# Patient Record
Sex: FEMALE | Race: WHITE
[De-identification: names, ages, dates, MRNs, and addresses within clinical notes are randomized per-mention and may not be internally consistent; named-entity substitution may affect disease eponyms.]

---

## 2019-06-19 ENCOUNTER — HOSPITAL ENCOUNTER (EMERGENCY)
Dept: HOSPITAL 91 - FTE | Age: 19
Discharge: HOME | End: 2019-06-19
Payer: SELF-PAY

## 2019-06-19 ENCOUNTER — HOSPITAL ENCOUNTER (EMERGENCY)
Dept: HOSPITAL 10 - FTE | Age: 19
Discharge: HOME | End: 2019-06-19
Payer: SELF-PAY

## 2019-06-19 VITALS — HEART RATE: 93 BPM | DIASTOLIC BLOOD PRESSURE: 86 MMHG | RESPIRATION RATE: 20 BRPM | SYSTOLIC BLOOD PRESSURE: 137 MMHG

## 2019-06-19 VITALS
WEIGHT: 175.49 LBS | HEIGHT: 65 IN | BODY MASS INDEX: 29.24 KG/M2 | WEIGHT: 175.49 LBS | BODY MASS INDEX: 29.24 KG/M2 | HEIGHT: 65 IN

## 2019-06-19 DIAGNOSIS — N39.0: Primary | ICD-10-CM

## 2019-06-19 LAB
ADD MAN DIFF?: NO
ADD UMIC: YES
ALANINE AMINOTRANSFERASE: 22 IU/L (ref 13–69)
ALBUMIN/GLOBULIN RATIO: 1.16
ALBUMIN: 4.2 G/DL (ref 3.3–4.9)
ALKALINE PHOSPHATASE: 90 IU/L (ref 42–121)
ANION GAP: 8 (ref 5–13)
ASPARTATE AMINO TRANSFERASE: 23 IU/L (ref 15–46)
BASOPHIL #: 0 10^3/UL (ref 0–0.1)
BASOPHILS %: 0.1 % (ref 0–2)
BILIRUBIN,DIRECT: 0 MG/DL (ref 0–0.2)
BILIRUBIN,TOTAL: 0.2 MG/DL (ref 0.2–1.3)
BLOOD UREA NITROGEN: 10 MG/DL (ref 7–20)
CALCIUM: 9.1 MG/DL (ref 8.4–10.2)
CARBON DIOXIDE: 28 MMOL/L (ref 21–31)
CHLORIDE: 104 MMOL/L (ref 97–110)
CREATININE: 0.68 MG/DL (ref 0.44–1)
EOSINOPHILS #: 0.1 10^3/UL (ref 0–0.5)
EOSINOPHILS %: 0.8 % (ref 0–7)
GLOBULIN: 3.6 G/DL (ref 1.3–3.2)
GLUCOSE: 85 MG/DL (ref 70–220)
HEMATOCRIT: 34.6 % (ref 37–47)
HEMOGLOBIN: 11.1 G/DL (ref 12–16)
IMMATURE GRANS #M: 0.01 10^3/UL (ref 0–0.03)
IMMATURE GRANS % (M): 0.1 % (ref 0–0.43)
LIPASE: 96 U/L (ref 23–300)
LYMPHOCYTES #: 3 10^3/UL (ref 0.8–2.9)
LYMPHOCYTES %: 41.2 % (ref 18–55)
MEAN CORPUSCULAR HEMOGLOBIN: 27.7 PG (ref 29–33)
MEAN CORPUSCULAR HGB CONC: 32.1 G/DL (ref 32–37)
MEAN CORPUSCULAR VOLUME: 86.3 FL (ref 72–104)
MEAN PLATELET VOLUME: 9.8 FL (ref 7.4–10.4)
MONOCYTE #: 0.6 10^3/UL (ref 0.3–0.9)
MONOCYTES %: 8 % (ref 0–13)
NEUTROPHIL #: 3.6 10^3/UL (ref 1.6–7.5)
NEUTROPHILS %: 49.8 % (ref 30–74)
NUCLEATED RED BLOOD CELLS #: 0 10^3/UL (ref 0–0)
NUCLEATED RED BLOOD CELLS%: 0 /100WBC (ref 0–0)
PLATELET COUNT: 326 10^3/UL (ref 140–415)
POTASSIUM: 4.1 MMOL/L (ref 3.5–5.1)
RED BLOOD COUNT: 4.01 10^6/UL (ref 4.2–5.4)
RED CELL DISTRIBUTION WIDTH: 14.3 % (ref 11.5–14.5)
SODIUM: 140 MMOL/L (ref 135–144)
TOTAL PROTEIN: 7.8 G/DL (ref 6.1–8.1)
UR ASCORBIC ACID: NEGATIVE MG/DL
UR BILIRUBIN (DIP): NEGATIVE MG/DL
UR BLOOD (DIP): (no result) MG/DL
UR CLARITY: (no result)
UR COLOR: YELLOW
UR GLUCOSE (DIP): NEGATIVE MG/DL
UR KETONES (DIP): NEGATIVE MG/DL
UR LEUKOCYTE ESTERASE (DIP): (no result) LEU/UL
UR MUCUS: (no result) /HPF
UR NITRITE (DIP): NEGATIVE MG/DL
UR PH (DIP): 6 (ref 5–9)
UR RBC: 4 /HPF (ref 0–5)
UR SPECIFIC GRAVITY (DIP): 1.02 (ref 1–1.03)
UR SQUAMOUS EPITHELIAL CELL: (no result) /HPF
UR TOTAL PROTEIN (DIP): NEGATIVE MG/DL
UR UROBILINOGEN (DIP): NEGATIVE MG/DL
UR WBC: 7 /HPF (ref 0–5)
WHITE BLOOD COUNT: 7.3 10^3/UL (ref 4.8–10.8)

## 2019-06-19 PROCEDURE — 81025 URINE PREGNANCY TEST: CPT

## 2019-06-19 PROCEDURE — 81001 URINALYSIS AUTO W/SCOPE: CPT

## 2019-06-19 PROCEDURE — 99283 EMERGENCY DEPT VISIT LOW MDM: CPT

## 2019-06-19 PROCEDURE — 83690 ASSAY OF LIPASE: CPT

## 2019-06-19 PROCEDURE — 85025 COMPLETE CBC W/AUTO DIFF WBC: CPT

## 2019-06-19 PROCEDURE — 36415 COLL VENOUS BLD VENIPUNCTURE: CPT

## 2019-06-19 PROCEDURE — 80053 COMPREHEN METABOLIC PANEL: CPT

## 2019-06-19 NOTE — ERD
ER Documentation


Chief Complaint


Chief Complaint





nausea,vomitting, back pain





HPI


This is a 18-year-old female with a nonsignificant past medical history presents


ED with complaints of off-and-on nausea and vomiting for the past 2 weeks.  


Patient states that she only gets nauseous in the evening after she eats a big 


meal.  Denies vomiting throughout the day.  Admits to low back pain and 


increased frequency of urination.  Denies dysuria, hematuria, hematemesis, 


diarrhea, constipation, abdominal pain, melena, hematochezia, vaginal pain, 


vaginal discharge.  Patient is sexually active but is on oral contraceptive.  


Patient is G0, P0.





ROS


All systems reviewed and are negative except as per history of present illness.





Allergies


Allergies:  


Coded Allergies:  


     No Known Allergy (Unverified , 6/19/19)





PMhx/Soc


Medical and Surgical Hx:  pt denies Medical Hx, pt denies Surgical Hx


Hx Alcohol Use:  No


Hx Substance Use:  No


Hx Tobacco Use:  No





FmHx


Family History:  No diabetes





Physical Exam


Vitals





Vital Signs


  Date      Temp  Pulse  Resp  B/P (MAP)   Pulse Ox  O2          O2 Flow    FiO2


Time                                                 Delivery    Rate


   6/19/19  98.9     93    20      137/86        98


     15:09                          (103)





Physical Exam


Physical Exam


Vitals signs: Reviewed by me.


General: Well developed, well nourished, in no acute distress. Patient is awake 


and alert.


Head: Normocephalic, atraumatic.


Eyes: Normal conjunctiva, Pupils PERRLA, EOM intact grossly


ENT: Pharynx is clear, Moist mucous membranes, external ears, nose and mouth 


normal


Neck: Supple, no masses, lymphadenopathy or JVD


Respiratory: Clear to auscultation bilaterally with no wheezing, rhonchi, rales,


no distress


Cardiovascular: RRR, no murmurs, rubs, or gallops


Abdominal: Soft, non-tender, non-distended, no peritoneal signs


: Deferred


MSK: No edema


Back: No midline tenderness.  No CVA tenderness


Neurologic: Alert and oriented, moving all extremities, normal speech, no focal 


weakness, no cerebellar signs. Normal mentation


Skin: warm and dry, No rash


Psych: Normal mood


Result Diagram:  


6/19/19 1607                                                                    


           6/19/19 1607





Results 24 hrs





Laboratory Tests


Test
                               6/19/19
16:00   6/19/19
16:07  6/19/19
16:10


Urine Color                       YELLOW


Urine Clarity
                    SLIGHTLY
CLOUDY  
               



Urine pH                                     6.0


Urine Specific Gravity                     1.024


Urine Ketones                     NEGATIVE mg/dL


Urine Nitrite                     NEGATIVE mg/dL


Urine Bilirubin                   NEGATIVE mg/dL


Urine Urobilinogen                NEGATIVE mg/dL


Urine Leukocyte Esterase               2+ Malcolm/ul


Urine Microscopic RBC                     4 /HPF


Urine Microscopic WBC                     7 /HPF


Urine Squamous Epithelial
Cells   FEW /HPF 
       
               



Urine Mucus                       FEW /HPF


Urine Hemoglobin                        3+ mg/dL


Urine Glucose                     NEGATIVE mg/dL


Urine Total Protein               NEGATIVE mg/dl


White Blood Count                                    7.3 10^3/ul


Red Blood Count                                     4.01 10^6/ul


Hemoglobin                                             11.1 g/dl


Hematocrit                                                34.6 %


Mean Corpuscular Volume                                  86.3 fl


Mean Corpuscular Hemoglobin                              27.7 pg


Mean Corpuscular                  
                   32.1 g/dl 
  



Hemoglobin
Concent


Red Cell Distribution Width                               14.3 %


Platelet Count                                       326 10^3/UL


Mean Platelet Volume                                      9.8 fl


Immature Granulocytes %                                  0.100 %


Neutrophils %                                             49.8 %


Lymphocytes %                                             41.2 %


Monocytes %                                                8.0 %


Eosinophils %                                              0.8 %


Basophils %                                                0.1 %


Nucleated Red Blood Cells %                          0.0 /100WBC


Immature Granulocytes #                            0.010 10^3/ul


Neutrophils #                                        3.6 10^3/ul


Lymphocytes #                                        3.0 10^3/ul


Monocytes #                                          0.6 10^3/ul


Eosinophils #                                        0.1 10^3/ul


Basophils #                                          0.0 10^3/ul


Nucleated Red Blood Cells #                          0.0 10^3/ul


Sodium Level                                          140 mmol/L


Potassium Level                                       4.1 mmol/L


Chloride Level                                        104 mmol/L


Carbon Dioxide Level                                   28 mmol/L


Anion Gap                                                      8


Blood Urea Nitrogen                                     10 mg/dl


Creatinine                                            0.68 mg/dl


Est Glomerular Filtrat            
                > 60 mL/min 
   



Rate
mL/min


Glucose Level                                           85 mg/dl


Calcium Level                                          9.1 mg/dl


Total Bilirubin                                        0.2 mg/dl


Direct Bilirubin                                      0.00 mg/dl


Indirect Bilirubin                                     0.2 mg/dl


Aspartate Amino                   
                     23 IU/L 
  



Transf
(AST/SGOT)


Alanine                           
                     22 IU/L 
  



Aminotransferase
(ALT/SGPT)


Alkaline Phosphatase                                     90 IU/L


Total Protein                                           7.8 g/dl


Albumin                                                 4.2 g/dl


Globulin                                               3.60 g/dl


Albumin/Globulin Ratio                                      1.16


Lipase                                                    96 U/L


POC Beta HCG, Qualitative                                          NEGATIVE








Procedures/MDM





LAB INTERPRETATION:


CBC shows no evidence of hemorrhage or infection, mildly decreased hemoglobin 


11.1, hematocrit 34.6


Urine pregnancy negative


Urinalysis shows 7 WBCs, 4 RBCs, 2+ leukocyte esterase


Chemistry shows no evidence of significant electrolyte abnormalities or renal 


insufficiency


Liver function test shows no evidence of acute biliary or hepatic dysfunction


Lipase shows no evidence of acute pancreatitis





ER COURSE:


The patient was offered medication for nausea but declined stating that she is 


not nauseous right now.


The patient was stable throughout ED course.


I kept the patient and/or family informed of laboratory and diagnostic imaging 


results throughout the emergency room course.





The patient was promptly evaluated and a treatment plan was devised based on H&P


and other data. This plan was discussed with the patient who agreed and had no 


further questions or concerns prior to discharge.





MEDICAL DECISION MAKING:


This is a 18-year-old female who presents ED with complaints of off-and-on 


nausea and vomiting for the past 2 weeks.  Patient is also complaining of 


increased frequency of urination and low back pain.  Physical examination is 


unremarkable.  Urinalysis reflects UTI.  We will treat patient with antibiotics 


to cover for UTI.  Given patient's nausea vomiting and low back pain will also 


treat her with antibiotic cover for pyelonephritis.  At this time there is no 


genitourinary or intra-abdominal emergency.  No evidence of appendicitis, 


cholecystitis, cholangitis, pancreatitis, small bowel obstruction, perforated 


viscus, tubo-ovarian abscess, to ovarian torsion, ectopic pregnancy, among 


others.  Vitals are stable patient can be managed with close outpatient follow-


up.  Advised patient to follow-up with primary care in the next 48 hours.  


Return to ED with any worsening symptoms


 





DISPOSITION PLAN:


We discussed follow up with the patient's primary care doctor within 24 to 48 


hours.  Patient counseled regarding my diagnostic impression and care plan. 


Prior to discharge all questions answered. Pt agrees with treatment plan and 


understands strict return precautions. Precautionary instructions provided 


including instructions to return to the ER if not improving or for any worsening


or changing symptoms or concerns.





SPECIALIST FOLLOW UP RECOMMENDED: None


Patient has been advised to follow up with primary care in 1-2 days.





Disclaimer: Inadvertent spelling and grammatical errors are likely due to 


EHR/dictation software use and do not reflect on the overall quality of patient 


care. Also, please note that the electronic time recorded on this note does not 


necessarily reflect the actual time of the patient encounter.





Departure


Diagnosis:  


   Primary Impression:  


   UTI (urinary tract infection)


   Urinary tract infection type:  site unspecified  Hematuria presence:  without


   hematuria  Qualified Codes:  N39.0 - Urinary tract infection, site not 


   specified


   Additional Impression:  


   Nausea and vomiting


   Vomiting type:  unspecified  Vomiting Intractability:  non-intractable  


   Qualified Codes:  R11.2 - Nausea with vomiting, unspecified


Condition:  Stable


Patient Instructions:  Nausea and Vomiting-Adult, Pyelonephritis, Female 


(Adult), Understanding Urinary Tract Infections (UTIs)


Referrals:  


[facility]





Additional Instructions:  


Patient advised to return to the ED immediately for new or worsening symptoms. 


Patient advised to follow up with primary care provider in the next 24-48 hours.


Patient verbalized understanding and agrees with treatment plan and course of 


action.





If patient has no primary care they may follow up with one of the community 


clinics listed on the following page or one of the options listed below








[facility]


[address] [place] [id number]





or





[facility]


[address] [place] [address]





or





[facility]


[address] [place] [address]











ZACKERY MCKEON PA-C          Jun 19, 2019 16:45